# Patient Record
Sex: MALE | Race: BLACK OR AFRICAN AMERICAN | Employment: PART TIME | URBAN - METROPOLITAN AREA
[De-identification: names, ages, dates, MRNs, and addresses within clinical notes are randomized per-mention and may not be internally consistent; named-entity substitution may affect disease eponyms.]

---

## 2017-12-07 ENCOUNTER — HOSPITAL ENCOUNTER (EMERGENCY)
Facility: CLINIC | Age: 22
Discharge: HOME | End: 2017-12-07
Attending: EMERGENCY MEDICINE | Admitting: EMERGENCY MEDICINE

## 2017-12-07 VITALS
TEMPERATURE: 97.4 F | WEIGHT: 142 LBS | SYSTOLIC BLOOD PRESSURE: 126 MMHG | BODY MASS INDEX: 24.37 KG/M2 | OXYGEN SATURATION: 98 % | DIASTOLIC BLOOD PRESSURE: 67 MMHG | RESPIRATION RATE: 18 BRPM | HEART RATE: 72 BPM

## 2017-12-07 DIAGNOSIS — L73.9 FOLLICULITIS: ICD-10-CM

## 2017-12-07 PROCEDURE — 99283 EMERGENCY DEPT VISIT LOW MDM: CPT | Mod: Z6 | Performed by: EMERGENCY MEDICINE

## 2017-12-07 PROCEDURE — 99282 EMERGENCY DEPT VISIT SF MDM: CPT | Performed by: EMERGENCY MEDICINE

## 2017-12-07 RX ORDER — IBUPROFEN 600 MG/1
600 TABLET, FILM COATED ORAL ONCE
Status: DISCONTINUED | OUTPATIENT
Start: 2017-12-07 | End: 2017-12-08 | Stop reason: HOSPADM

## 2017-12-07 RX ORDER — CEPHALEXIN 500 MG/1
500 CAPSULE ORAL 4 TIMES DAILY
Qty: 28 CAPSULE | Refills: 0 | Status: SHIPPED | OUTPATIENT
Start: 2017-12-07 | End: 2017-12-14

## 2017-12-07 RX ORDER — MUPIROCIN CALCIUM 20 MG/G
CREAM TOPICAL 3 TIMES DAILY
Qty: 30 G | Refills: 0 | Status: SHIPPED | OUTPATIENT
Start: 2017-12-07 | End: 2017-12-14

## 2017-12-07 ASSESSMENT — ENCOUNTER SYMPTOMS: FEVER: 0

## 2017-12-07 NOTE — ED AVS SNAPSHOT
[facility], Emergency Department    [address]    [place] [address]    Phone:  [phone]    Fax:  [phone]                                       Denise Goodrich   MRN: [id number]    Department:  [facility], Emergency Department   Date of Visit:  12/7/2017           Patient Information     Date Of Birth          1995        Your diagnoses for this visit were:     Folliculitis        You were seen by Kelvin Hilario MD.        Discharge Instructions         Folliculitis  Folliculitis is an inflammation of a hair follicle. A hair follicle is the little pocket where a hair grows out of the skin. Bacteria normally live on the skin. But sometimes bacteria can get trapped in a follicle and cause infection. This causes a bumpy rash. The area over the follicles is red and raised. It may itch or be painful. The bumps may have fluid (pus) inside. The pus may leak and then form crusts. Sores can spread to other areas of the body. Once it goes away, folliculitis can come back at any time. Severe cases may cause permanent hair loss and scarring.  Folliculitis can happen anywhere on the body where hair grows. It can be caused by rubbing from tight clothing. Ingrown hairs can cause it. Soaking in a hot tub or swimming pool that has bacteria in the water can cause it. It may also occur if a hair follicle is blocked by a bandage.  Sores often go away in a few days with no treatment. In some cases, medicine may be given. A small piece of skin or pus may be taken to find the type of bacteria causing the infection.  Home care  The healthcare provider may prescribe an antibiotic cream or ointment.  Oral antibiotics may also be prescribed. Or you may be told to use an over-the-counter antibiotic cream. Follow all instructions when using any of these medicines.  General care:    Apply warm, moist compresses to the sores for 20 minutes up to 3 times a day. You can make a compress by soaking a cloth in warm water.  Squeeze out excess water.    Don t cut, poke, or squeeze the sores. This can be painful and spread infection.    Don t scratch the affected area. Scratching can delay healing.    Don t shave the areas affected by folliculitis.    If the sores leak fluid, cover the area with a nonstick gauze bandage. Use as little tape as possible. Carefully discard all soiled bandages.    Dress in loose cotton clothing.    Change sheets and blankets if they are soiled by pus. Wash all clothes, towels, sheets, and cloth diapers in soap and hot water. Do not share clothes, towels, or sheets with other family members.    Do not soak the sores in bath water. This can spread infection. Instead, keep the area clean by gently washing sores with soap and warm water.    Wash your hands or use antibacterial gels often to prevent spreading the bacteria.  Follow-up care  Follow up with your healthcare provider, or as advised.  When to seek medical advice  Call your healthcare provider right away if any of these occur:    Fever of 100.4 F (38 C) or higher    Spreading of the rash    Rash does not get better with treatment    Redness or swelling that gets worse    Rash becomes more painful    Foul-smelling fluid leaking from the skin    Rash improves, but then comes back   Date Last Reviewed: 11/1/2016 2000-2017 [employer]. [address] [place] [address]. All rights reserved. This information is not intended as a substitute for professional medical care. Always follow your healthcare professional's instructions.      Use mupirocin cream as directed.  Take complete course of cephalexin.  Take ibuprofen 600 mg every 6 hours with food as needed for pain.    Please make an appointment to follow up with Your Primary Care Provider or [facility] (phone: [phone]) in 1 week.     24 Hour Appointment Hotline       To make an appointment at any [facility], call [phone] ([phone]). If  you don't have a family doctor or clinic, we will help you find one. [facility] clinics are conveniently located to serve the needs of you and your family.             Review of your medicines      START taking        Dose / Directions Last dose taken    cephALEXin 500 MG capsule   Commonly known as:  KEFLEX   Dose:  500 mg   Quantity:  28 capsule        Take 1 capsule (500 mg) by mouth 4 times daily for 7 days   Refills:  0        mupirocin 2 % cream   Commonly known as:  BACTROBAN   Quantity:  30 g        Apply topically 3 times daily for 7 days Apply to folliculitis rash.   Refills:  0          Our records show that you are taking the medicines listed below. If these are incorrect, please call your family doctor or clinic.        Dose / Directions Last dose taken    IBUPROFEN PO   Dose:  400 mg        Take 400 mg by mouth every 4 hours   Refills:  0                Prescriptions were sent or printed at these locations (2 Prescriptions)                   Other Prescriptions                Printed at Department/Unit printer (2 of 2)         mupirocin (BACTROBAN) 2 % cream               cephALEXin (KEFLEX) 500 MG capsule                Orders Needing Specimen Collection     None      Pending Results     No orders found from 12/5/2017 to 12/8/2017.            Pending Culture Results     No orders found from 12/5/2017 to 12/8/2017.            Pending Results Instructions     If you had any lab results that were not finalized at the time of your Discharge, you can call the ED Lab Result RN at [phone]. You will be contacted by this team for any positive Lab results or changes in treatment. The nurses are available 7 days a week from 10A to 6:30P.  You can leave a message 24 hours per day and they will return your call.        Thank you for choosing [facility]       Thank you for choosing [facility] for your care. Our goal is always to provide you with excellent care. Hearing back from our patients is one way we can  "continue to improve our services. Please take a few minutes to complete the written survey that you may receive in the mail after you visit with us. Thank you!        [employer] Information     [employer] lets you send messages to your doctor, view your test results, renew your prescriptions, schedule appointments and more. To sign up, go to [email or web address] . Click on \"Log in\" on the left side of the screen, which will take you to the Welcome page. Then click on \"Sign up Now\" on the right side of the page.     You will be asked to enter the access code listed below, as well as some personal information. Please follow the directions to create your username and password.     Your access code is: [email or web address]  Expires: 3/7/2018  9:48 PM     Your access code will  in 90 days. If you need help or a new code, please call your [facility] clinic or [phone].        Care EveryWhere ID     This is your Care EveryWhere ID. This could be used by other organizations to access your [facility] medical records  [id number]        Equal Access to Services     [facility] CenterMCKENZIE : Hadsanto Meyer, wajosué michaels, herrera hernandez, anika valdez. So [facility] [phone].    ATENCIÓN: Si habla español, tiene a amezcua disposición servicios gratuitos de asistencia lingüística. Fredo al [phone].    We comply with applicable federal civil rights laws and [place] laws. We do not discriminate on the basis of race, color, national origin, age, disability, sex, sexual orientation, or gender identity.            After Visit Summary       This is your record. Keep this with you and show to your community pharmacist(s) and doctor(s) at your next visit.                  "

## 2017-12-07 NOTE — ED AVS SNAPSHOT
[facility], [place], Emergency Department    [address]    [place] [address]    Phone:  [phone]    Fax:  [phone]                                       Denise Goodrich   MRN: [id number]    Department:  [facility], Emergency Department   Date of Visit:  12/7/2017           After Visit Summary Signature Page     I have received my discharge instructions, and my questions have been answered. I have discussed any challenges I see with this plan with the nurse or doctor.    ..........................................................................................................................................  Patient/Patient Representative Signature      ..........................................................................................................................................  Patient Representative Print Name and Relationship to Patient    ..................................................               ................................................  Date                                            Time    ..........................................................................................................................................  Reviewed by Signature/Title    ...................................................              ..............................................  Date                                                            Time

## 2017-12-08 NOTE — DISCHARGE INSTRUCTIONS
Folliculitis  Folliculitis is an inflammation of a hair follicle. A hair follicle is the little pocket where a hair grows out of the skin. Bacteria normally live on the skin. But sometimes bacteria can get trapped in a follicle and cause infection. This causes a bumpy rash. The area over the follicles is red and raised. It may itch or be painful. The bumps may have fluid (pus) inside. The pus may leak and then form crusts. Sores can spread to other areas of the body. Once it goes away, folliculitis can come back at any time. Severe cases may cause permanent hair loss and scarring.  Folliculitis can happen anywhere on the body where hair grows. It can be caused by rubbing from tight clothing. Ingrown hairs can cause it. Soaking in a hot tub or swimming pool that has bacteria in the water can cause it. It may also occur if a hair follicle is blocked by a bandage.  Sores often go away in a few days with no treatment. In some cases, medicine may be given. A small piece of skin or pus may be taken to find the type of bacteria causing the infection.  Home care  The healthcare provider may prescribe an antibiotic cream or ointment.  Oral antibiotics may also be prescribed. Or you may be told to use an over-the-counter antibiotic cream. Follow all instructions when using any of these medicines.  General care:    Apply warm, moist compresses to the sores for 20 minutes up to 3 times a day. You can make a compress by soaking a cloth in warm water. Squeeze out excess water.    Don t cut, poke, or squeeze the sores. This can be painful and spread infection.    Don t scratch the affected area. Scratching can delay healing.    Don t shave the areas affected by folliculitis.    If the sores leak fluid, cover the area with a nonstick gauze bandage. Use as little tape as possible. Carefully discard all soiled bandages.    Dress in loose cotton clothing.    Change sheets and blankets if they are soiled by pus. Wash all clothes,  towels, sheets, and cloth diapers in soap and hot water. Do not share clothes, towels, or sheets with other family members.    Do not soak the sores in bath water. This can spread infection. Instead, keep the area clean by gently washing sores with soap and warm water.    Wash your hands or use antibacterial gels often to prevent spreading the bacteria.  Follow-up care  Follow up with your healthcare provider, or as advised.  When to seek medical advice  Call your healthcare provider right away if any of these occur:    Fever of 100.4 F (38 C) or higher    Spreading of the rash    Rash does not get better with treatment    Redness or swelling that gets worse    Rash becomes more painful    Foul-smelling fluid leaking from the skin    Rash improves, but then comes back   Date Last Reviewed: 11/1/2016 2000-2017 [employer]. [address] [place] [address]. All rights reserved. This information is not intended as a substitute for professional medical care. Always follow your healthcare professional's instructions.      Use mupirocin cream as directed.  Take complete course of cephalexin.  Take ibuprofen 600 mg every 6 hours with food as needed for pain.    Please make an appointment to follow up with Your Primary Care Provider or [facility] (phone: [phone]) in 1 week.

## 2017-12-08 NOTE — ED PROVIDER NOTES
History     Chief Complaint   Patient presents with     Rash     Facial rash began 3 days ago.  Painful and itchy.  Noted on both cheeks and below lower lip.      EMILE Goodrich is a 21 year old male who presents to the emergency department complaining of facial rash for the past 3 days.  Patient states the rash is occasionally painful and itchy.  He has noticed bumps on his bilateral cheeks and below his lower lip.  He does report that he was recently incarcerated.  He denies any fever.  He denies dental pain.  He does state that he used a different kind of soap when he was in group home.  He denies any other different or new topical exposures.    I have reviewed the Medications, Allergies, Past Medical and Surgical History, and Social History in the Epic system.    Review of Systems   Constitutional: Negative for fever.   HENT: Negative for dental problem.    Skin: Positive for rash.   All other systems reviewed and are negative.      Physical Exam   BP: 126/67  Pulse: 72  Temp: 97.4  F (36.3  C)  Resp: 18  Weight: 64.4 kg (142 lb)  SpO2: 98 %      Physical Exam   Constitutional: He is oriented to person, place, and time. He appears well-developed and well-nourished.   HENT:   Head: Normocephalic and atraumatic.       Right Ear: External ear normal.   Left Ear: External ear normal.   Mouth/Throat: Oropharynx is clear and moist.   Eyes: Pupils are equal, round, and reactive to light.   Neurological: He is alert and oriented to person, place, and time. No cranial nerve deficit.   Psychiatric: He has a normal mood and affect. His behavior is normal.   Nursing note and vitals reviewed.      ED Course     ED Course     Procedures            Critical Care time:             Labs Ordered and Resulted from Time of ED Arrival Up to the Time of Departure from the ED - No data to display         Assessments & Plan (with Medical Decision Making)   21 year old male to the emergency department with folliculitis of the face.   Patient did use a different soap when he was recently incarcerated.  The patient will be treated with both Bactroban cream as well as cephalexin.  Primary care follow-up recommended for 1 week.    I have reviewed the nursing notes.    I have reviewed the findings, diagnosis, plan and need for follow up with the patient.    New Prescriptions    CEPHALEXIN (KEFLEX) 500 MG CAPSULE    Take 1 capsule (500 mg) by mouth 4 times daily for 7 days    MUPIROCIN (BACTROBAN) 2 % CREAM    Apply topically 3 times daily for 7 days Apply to folliculitis rash.       Final diagnoses:   Folliculitis       12/7/2017   [facility], [place], EMERGENCY DEPARTMENT     Kelvin Hilario MD  12/07/17 7225

## 2018-11-28 ENCOUNTER — HOSPITAL ENCOUNTER (EMERGENCY)
Facility: CLINIC | Age: 23
Discharge: HOME | End: 2018-11-28
Attending: FAMILY MEDICINE | Admitting: FAMILY MEDICINE

## 2018-11-28 ENCOUNTER — APPOINTMENT (OUTPATIENT)
Dept: GENERAL RADIOLOGY | Facility: CLINIC | Age: 23
End: 2018-11-28
Attending: FAMILY MEDICINE

## 2018-11-28 VITALS
SYSTOLIC BLOOD PRESSURE: 149 MMHG | TEMPERATURE: 97.3 F | OXYGEN SATURATION: 98 % | DIASTOLIC BLOOD PRESSURE: 56 MMHG | HEART RATE: 78 BPM | RESPIRATION RATE: 16 BRPM

## 2018-11-28 DIAGNOSIS — S62.501A CLOSED NONDISPLACED FRACTURE OF PHALANX OF RIGHT THUMB, UNSPECIFIED PHALANX, INITIAL ENCOUNTER: ICD-10-CM

## 2018-11-28 PROCEDURE — 73140 X-RAY EXAM OF FINGER(S): CPT | Mod: RT

## 2018-11-28 PROCEDURE — 99284 EMERGENCY DEPT VISIT MOD MDM: CPT | Mod: 25 | Performed by: FAMILY MEDICINE

## 2018-11-28 PROCEDURE — 26750 TREAT FINGER FRACTURE EACH: CPT | Mod: 54 | Performed by: FAMILY MEDICINE

## 2018-11-28 PROCEDURE — 26750 TREAT FINGER FRACTURE EACH: CPT | Mod: RT | Performed by: FAMILY MEDICINE

## 2018-11-28 PROCEDURE — 25000132 ZZH RX MED GY IP 250 OP 250 PS 637: Performed by: FAMILY MEDICINE

## 2018-11-28 RX ORDER — OXYCODONE AND ACETAMINOPHEN 5; 325 MG/1; MG/1
1 TABLET ORAL ONCE
Status: DISCONTINUED | OUTPATIENT
Start: 2018-11-28 | End: 2018-11-28 | Stop reason: HOSPADM

## 2018-11-28 RX ORDER — OXYCODONE HYDROCHLORIDE 5 MG/1
5 TABLET ORAL EVERY 6 HOURS PRN
Qty: 10 TABLET | Refills: 0 | Status: SHIPPED | OUTPATIENT
Start: 2018-11-28 | End: 2019-01-31

## 2018-11-28 RX ORDER — IBUPROFEN 800 MG/1
800 TABLET, FILM COATED ORAL ONCE
Status: COMPLETED | OUTPATIENT
Start: 2018-11-28 | End: 2018-11-28

## 2018-11-28 RX ADMIN — IBUPROFEN 800 MG: 800 TABLET ORAL at 16:23

## 2018-11-28 NOTE — ED NOTES
"Pt reports \"jamming' right thumb 2 days ago. Now unable to move thumb distal joint. Edema around joint. Pt is a .   "

## 2018-11-28 NOTE — ED AVS SNAPSHOT
[facility], Emergency Department    [address]    [place] [address]    Phone:  [phone]    Fax:  [phone]                                       Denise Goodrich   MRN: [id number]    Department:  [facility], Emergency Department   Date of Visit:  11/28/2018           Patient Information     Date Of Birth          1995        Your diagnoses for this visit were:     Closed nondisplaced fracture of phalanx of right thumb, unspecified phalanx, initial encounter        You were seen by Chance Ponce MD.        Discharge Instructions       Thank you for choosing [facility].     Please closely monitor for further symptoms. Return to the Emergency Department if you develop any new or worsening signs or symptoms.    If you received any opiate pain medications or sedatives during your visit, please do not drive for at least 8 hours.     Labs, cultures or final xray interpretations may still need to be reviewed.  We will call you if your plan of care needs to be changed.    Please make an appointment to follow up with Orthopedics (phone: [phone]) in next 5-7 days.        24 Hour Appointment Hotline       To make an appointment at any [facility], call [phone] ([phone]). If you don't have a family doctor or clinic, we will help you find one. [facility] clinics are conveniently located to serve the needs of you and your family.             Review of your medicines      START taking        Dose / Directions Last dose taken    oxyCODONE 5 MG tablet   Commonly known as:  ROXICODONE   Dose:  5 mg   Quantity:  10 tablet        Take 1 tablet (5 mg) by mouth every 6 hours as needed for pain   Refills:  0          Our records show that you are taking the medicines listed below. If these are incorrect, please call your family doctor or clinic.        Dose / Directions Last dose taken    IBUPROFEN PO   Dose:  400 mg        Take 400 mg by mouth every 4 hours    Refills:  0                Information about OPIOIDS     PRESCRIPTION OPIOIDS: WHAT YOU NEED TO KNOW   We gave you an opioid (narcotic) pain medicine. It is important to manage your pain, but opioids are not always the best choice. You should first try all the other options your care team gave you. Take this medicine for as short a time (and as few doses) as possible.    Some activities can increase your pain, such as bandage changes or therapy sessions. It may help to take your pain medicine 30 to 60 minutes before these activities. Reduce your stress by getting enough sleep, working on hobbies you enjoy and practicing relaxation or meditation. Talk to your care team about ways to manage your pain beyond prescription opioids.    These medicines have risks:    DO NOT drive when on new or higher doses of pain medicine. These medicines can affect your alertness and reaction times, and you could be arrested for driving under the influence (DUI). If you need to use opioids long-term, talk to your care team about driving.    DO NOT operate heavy machinery    DO NOT do any other dangerous activities while taking these medicines.    DO NOT drink any alcohol while taking these medicines.     If the opioid prescribed includes acetaminophen, DO NOT take with any other medicines that contain acetaminophen. Read all labels carefully. Look for the word  acetaminophen  or  Tylenol.  Ask your pharmacist if you have questions or are unsure.    You can get addicted to pain medicines, especially if you have a history of addiction (chemical, alcohol or substance dependence). Talk to your care team about ways to reduce this risk.    All opioids tend to cause constipation. Drink plenty of water and eat foods that have a lot of fiber, such as fruits, vegetables, prune juice, apple juice and high-fiber cereal. Take a laxative (Miralax, milk of magnesia, Colace, Senna) if you don t move your bowels at least every other day. Other side  effects include upset stomach, sleepiness, dizziness, throwing up, tolerance (needing more of the medicine to have the same effect), physical dependence and slowed breathing.    Store your pills in a secure place, locked if possible. We will not replace any lost or stolen medicine. If you don t finish your medicine, please throw away (dispose) as directed by your pharmacist. The [place] [employer] has more information about safe disposal: https://[email or web address].[facility].[place]/living-green/managing-unwanted-medications        Prescriptions were sent or printed at these locations (1 Prescription)                   Other Prescriptions                Printed at Department/Unit printer (1 of 1)         oxyCODONE (ROXICODONE) 5 MG tablet                Procedures and tests performed during your visit     XR Finger Right G/E 2 Views      Orders Needing Specimen Collection     None      Pending Results     Date and Time Order Name Status Description    11/28/2018 1604 XR Finger Right G/E 2 Views Preliminary             Pending Culture Results     No orders found from 11/26/2018 to 11/29/2018.            Pending Results Instructions     If you had any lab results that were not finalized at the time of your Discharge, you can call the ED Lab Result RN at [phone]. You will be contacted by this team for any positive Lab results or changes in treatment. The nurses are available 7 days a week from 10A to 6:30P.  You can leave a message 24 hours per day and they will return your call.        Thank you for choosing [facility]       Thank you for choosing [facility] for your care. Our goal is always to provide you with excellent care. Hearing back from our patients is one way we can continue to improve our services. Please take a few minutes to complete the written survey that you may receive in the mail after you visit with us. Thank you!        [employer] Information     [employer] lets you send messages to your doctor, view your  "test results, renew your prescriptions, schedule appointments and more. To sign up, go to [email or web address] . Click on \"Log in\" on the left side of the screen, which will take you to the Welcome page. Then click on \"Sign up Now\" on the right side of the page.     You will be asked to enter the access code listed below, as well as some personal information. Please follow the directions to create your username and password.     Your access code is: [email or web address]  Expires: 2019  5:01 PM     Your access code will  in 90 days. If you need help or a new code, please call your [facility] [place] [facility] clinic or [phone].        Care EveryWhere ID     This is your Care EveryWhere ID. This could be used by other organizations to access your [place] medical records  [id number]        Equal Access to Services     TIGRE JAMISON : Hadsanto Meyer, betsy michaels, herrera escobaralclyde hernandez, anika zhao . So [facility] [phone].    ATENCIÓN: Si habla español, tiene a amezcua disposición servicios gratuitos de asistencia lingüística. Llame al [phone].    We comply with applicable federal civil rights laws and [place] laws. We do not discriminate on the basis of race, color, national origin, age, disability, sex, sexual orientation, or gender identity.            After Visit Summary       This is your record. Keep this with you and show to your community pharmacist(s) and doctor(s) at your next visit.                  "

## 2018-11-28 NOTE — DISCHARGE INSTRUCTIONS
Thank you for choosing [facility].     Please closely monitor for further symptoms. Return to the Emergency Department if you develop any new or worsening signs or symptoms.    If you received any opiate pain medications or sedatives during your visit, please do not drive for at least 8 hours.     Labs, cultures or final xray interpretations may still need to be reviewed.  We will call you if your plan of care needs to be changed.    Please make an appointment to follow up with Orthopedics (phone: [phone]) in next 5-7 days.

## 2018-11-28 NOTE — ED AVS SNAPSHOT
[facility], [place], Emergency Department    [address]    [place] [address]    Phone:  [phone]    Fax:  [phone]                                       Denise Goodrich   MRN: [id number]    Department:  [facility], Emergency Department   Date of Visit:  11/28/2018           After Visit Summary Signature Page     I have received my discharge instructions, and my questions have been answered. I have discussed any challenges I see with this plan with the nurse or doctor.    ..........................................................................................................................................  Patient/Patient Representative Signature      ..........................................................................................................................................  Patient Representative Print Name and Relationship to Patient    ..................................................               ................................................  Date                                   Time    ..........................................................................................................................................  Reviewed by Signature/Title    ...................................................              ..............................................  Date                                               Time          22EPIC Rev 08/18

## 2018-11-28 NOTE — ED PROVIDER NOTES
History     Chief Complaint   Patient presents with     Thumb Discomfort     R thumb pain and swelling started 2 days ago; may have hurt it working as  but not sure     HPI  Denise Goodrich is a 22 year old male who presents to the Emergency Department today for evaluation of right thumb pain. The patient is a  and states that he was working 2 days ago when he jammed his thumb. He states he has been having pain in his right thumb and swelling at his distal right thumb joint.     I have reviewed the Medications, Allergies, Past Medical and Surgical History, and Social History in the [facility] system.    Past Medical History:   Diagnosis Date     Acute low back pain due to trauma      History reviewed. No pertinent surgical history.    No family history on file.    Social History   Substance Use Topics     Smoking status: Current Every Day Smoker     Packs/day: 1.00     Smokeless tobacco: Never Used     Alcohol use No     Current Facility-Administered Medications   Medication     oxyCODONE-acetaminophen (PERCOCET) 5-325 MG per tablet 1 tablet     Current Outpatient Prescriptions   Medication     oxyCODONE (ROXICODONE) 5 MG tablet     IBUPROFEN PO     Allergies   Allergen Reactions     Vicodin [Hydrocodone-Acetaminophen]      Causes headaches      Review of Systems  ROS: 14 point ROS neg other than the symptoms noted above in the HPI.   Physical Exam   BP: 144/78  Pulse: 89  Temp: 97.3  F (36.3  C)  Resp: 16  SpO2: 97 %    Physical Exam   Constitutional: He is oriented to person, place, and time. He appears well-developed and well-nourished. No distress.   HENT:   Head: Normocephalic.   Eyes: Pupils are equal, round, and reactive to light.   Neck: Neck supple.   Cardiovascular: Normal rate and intact distal pulses.    Pulmonary/Chest: Effort normal.   Musculoskeletal: He exhibits tenderness.        Hands:  Neurological: He is alert and oriented to person, place, and time.   Skin: Skin is warm and dry. No  rash noted. No erythema.   Psychiatric: He has a normal mood and affect.       ED Course   4:00 PM  The patient was seen and examined by Dr. Ponce in Room 20.    ED Course     Procedures             Critical Care time:  none          FINGER(S) TWO-THREE VIEWS RIGHT 11/28/2018 4:35 PM     HISTORY: Trauma;     COMPARISON: None.    IMPRESSION: There is a chip fracture off the base of the distal  phalanx.. Fragment measures about 4 mm in diameter. Fracture line  extends into the articular surface.        Labs Ordered and Resulted from Time of ED Arrival Up to the Time of Departure from the ED - No data to display         Assessments & Plan (with Medical Decision Making)   Patient injured his right thumb while working on a car.  He has pain and swelling at the IP joint of the right thumb without obvious deformity or ligamentous injury.  X-ray reveals a fracture at the base of the proximal phalanx which is intra-articular.  He was placed in a Velcro splint and treated for pain.  He has a history of opiate dependence but states he is sober now.  He has no concerns about taking short-term prescription for pain.    I have reviewed the nursing notes.    I have reviewed the findings, diagnosis, plan and need for follow up with the patient.    New Prescriptions    OXYCODONE (ROXICODONE) 5 MG TABLET    Take 1 tablet (5 mg) by mouth every 6 hours as needed for pain       Final diagnoses:   Closed nondisplaced fracture of phalanx of right thumb, unspecified phalanx, initial encounter   I, Yosvany Fajardo, am serving as a trained medical scribe to document services personally performed by Pablo Whitney MD, based on the provider's statements to me.   IPablo MD, was physically present and have reviewed and verified the accuracy of this note documented by Yosvany Fajardo.     11/28/2018   [facility], EMERGENCY DEPARTMENT     Chance Ponce MD  11/28/18 1700

## 2019-01-31 ENCOUNTER — HOSPITAL ENCOUNTER (EMERGENCY)
Facility: CLINIC | Age: 24
Discharge: HOME | End: 2019-01-31
Attending: EMERGENCY MEDICINE | Admitting: EMERGENCY MEDICINE
Payer: MEDICAID

## 2019-01-31 ENCOUNTER — APPOINTMENT (OUTPATIENT)
Dept: GENERAL RADIOLOGY | Facility: CLINIC | Age: 24
End: 2019-01-31
Payer: MEDICAID

## 2019-01-31 VITALS
WEIGHT: 155.38 LBS | DIASTOLIC BLOOD PRESSURE: 65 MMHG | TEMPERATURE: 98.2 F | OXYGEN SATURATION: 99 % | BODY MASS INDEX: 26.67 KG/M2 | HEART RATE: 80 BPM | SYSTOLIC BLOOD PRESSURE: 120 MMHG | RESPIRATION RATE: 16 BRPM

## 2019-01-31 DIAGNOSIS — S62.524A: ICD-10-CM

## 2019-01-31 DIAGNOSIS — S62.524D CLOSED NONDISPLACED FRACTURE OF DISTAL PHALANX OF RIGHT THUMB WITH ROUTINE HEALING, SUBSEQUENT ENCOUNTER: ICD-10-CM

## 2019-01-31 DIAGNOSIS — M79.644 THUMB PAIN, RIGHT: ICD-10-CM

## 2019-01-31 PROCEDURE — 25000132 ZZH RX MED GY IP 250 OP 250 PS 637: Performed by: EMERGENCY MEDICINE

## 2019-01-31 PROCEDURE — 73140 X-RAY EXAM OF FINGER(S): CPT | Mod: RT

## 2019-01-31 PROCEDURE — 29125 APPL SHORT ARM SPLINT STATIC: CPT | Mod: RT | Performed by: EMERGENCY MEDICINE

## 2019-01-31 PROCEDURE — 99284 EMERGENCY DEPT VISIT MOD MDM: CPT | Mod: 25 | Performed by: EMERGENCY MEDICINE

## 2019-01-31 PROCEDURE — 99284 EMERGENCY DEPT VISIT MOD MDM: CPT | Mod: 25

## 2019-01-31 PROCEDURE — 29125 APPL SHORT ARM SPLINT STATIC: CPT | Mod: RT

## 2019-01-31 RX ORDER — TRAMADOL HYDROCHLORIDE 50 MG/1
50 TABLET ORAL EVERY 4 HOURS PRN
Qty: 10 TABLET | Refills: 0 | Status: SHIPPED | OUTPATIENT
Start: 2019-01-31 | End: 2019-03-02

## 2019-01-31 RX ORDER — IBUPROFEN 600 MG/1
600 TABLET, FILM COATED ORAL ONCE
Status: COMPLETED | OUTPATIENT
Start: 2019-01-31 | End: 2019-01-31

## 2019-01-31 RX ADMIN — IBUPROFEN 600 MG: 600 TABLET ORAL at 15:41

## 2019-01-31 NOTE — ED PROVIDER NOTES
History     Chief Complaint   Patient presents with     Thumb Discomfort     Was seen here one month ago and told he had a fracture in the right thumb. Fell yesterday on the ice and hit the same spot, bending back the thumb again. Proximal thumb swollen. Numbness in tip and is very painful.      The history is provided by the patient and medical records.     Denise Goodrich is a 23 year old male who presents to the Emergency Department for evaluation after a fall, which occurred yesterday.  The patient reports he slipped and fell reinjuring his right thumb.  He was seen here approximately 1 month ago was told he had a fracture in that right thumb.  Patient denies any pain or trauma from the fall.  He denies other chronic medical issues.    I have reviewed the Medications, Allergies, Past Medical and Surgical History, and Social History in the [facility] system.    Past Medical History:   Diagnosis Date     Acute low back pain due to trauma        History reviewed. No pertinent surgical history.    No family history on file.    Social History     Tobacco Use     Smoking status: Current Every Day Smoker     Packs/day: 1.00     Smokeless tobacco: Never Used   Substance Use Topics     Alcohol use: No       No current facility-administered medications for this encounter.      Current Outpatient Medications   Medication     traMADol (ULTRAM) 50 MG tablet     IBUPROFEN PO        Allergies   Allergen Reactions     Vicodin [Hydrocodone-Acetaminophen]      Causes headaches       Review of Systems   Musculoskeletal:        Positive for right thumb pain   All other systems reviewed and are negative.      Physical Exam   BP: 131/61  Pulse: 80  Heart Rate: 83  Temp: 96.7  F (35.9  C)  Resp: 16  Weight: 70.5 kg (155 lb 6 oz)  SpO2: 99 %      Physical Exam   Constitutional: He appears well-developed. No distress.   HENT:   Head: Normocephalic.   Eyes: EOM are normal. No scleral icterus.   Neck: Neck supple.   Pulmonary/Chest: No  respiratory distress.   Musculoskeletal: He exhibits no deformity.   Right thumb diffuse tenderness with no apparent deformity.  Sensation intact.  Range of motion limited by pain   Neurological: He is alert.   Skin: Skin is dry.   Nursing note and vitals reviewed.      ED Course   3:10 PM  The patient was seen and examined by Dr. Sorto in Room ED09.        Procedures           Labs Ordered and Resulted from Time of ED Arrival Up to the Time of Departure from the ED - No data to display  Results for orders placed or performed during the hospital encounter of 01/31/19   XR Finger Right G/E 2 Views    Narrative    RIGHT FINGER TWO OR MORE VIEWS 1/31/2019 3:09 PM     HISTORY: Pain, fall.    COMPARISON: 11/28/2018.      Impression    IMPRESSION: Previous chip fracture through the base of the distal  first phalanx demonstrates interval sclerosis and healing. The  fracture line is still visible. No new acute lucent fracture line is  visualized.    JANE PENNINGTON MD            Assessments & Plan (with Medical Decision Making)   23-year-old presents with a chief complaint of thumb pain.  Differential includes fracture, tendon disruption, contusion.  There is no evidence of infection on exam.  Healing fracture present on x-ray.  No new displacement of fracture sites.  We will place the patient in a thumb spica splint and have him follow-up with Orth O for continued pain.  It is noted that patient was seen at [facility] 2 days ago with a similar complaint.  Strongly encouraged him to follow-up with orthopedics.    I have reviewed the nursing notes.    I have reviewed the findings, diagnosis, plan and need for follow up with the patient.       Medication List      Started    traMADol 50 MG tablet  Commonly known as:  ULTRAM  50 mg, Oral, EVERY 4 HOURS PRN            Final diagnoses:   Thumb pain, right   Closed nondisplaced fracture of distal phalanx of right thumb with routine healing, subsequent encounter   I, Amaury Hines,  am serving as a trained medical scribe to document services personally performed by Donald Sorto DO, based on the provider's statements to me.      I, Donald Sorto DO, was physically present and have reviewed and verified the accuracy of this note documented by Amaury Hines.    1/31/2019   [facility], [place], EMERGENCY DEPARTMENT     Donald Sorto DO  02/01/19 0737

## 2019-01-31 NOTE — DISCHARGE INSTRUCTIONS
Please make an appointment to follow up with Orthopedics (phone: [phone]) as soon as possible if not improving.

## 2019-01-31 NOTE — ED AVS SNAPSHOT
[facility], [place], Emergency Department  [address]  [place] [address]  Phone:  [phone]  Fax:  [phone]                                    Denise Goodrich   MRN: [id number]    Department:  [facility], Emergency Department   Date of Visit:  1/31/2019           After Visit Summary Signature Page    I have received my discharge instructions, and my questions have been answered. I have discussed any challenges I see with this plan with the nurse or doctor.    ..........................................................................................................................................  Patient/Patient Representative Signature      ..........................................................................................................................................  Patient Representative Print Name and Relationship to Patient    ..................................................               ................................................  Date                                   Time    ..........................................................................................................................................  Reviewed by Signature/Title    ...................................................              ..............................................  Date                                               Time          22EPIC Rev 08/18

## 2020-09-21 ENCOUNTER — HOSPITAL ENCOUNTER (EMERGENCY)
Facility: CLINIC | Age: 25
End: 2020-09-21

## 2020-09-21 ENCOUNTER — HOSPITAL ENCOUNTER (EMERGENCY)
Facility: CLINIC | Age: 25
Discharge: HOME | End: 2020-09-21
Attending: EMERGENCY MEDICINE | Admitting: EMERGENCY MEDICINE

## 2020-09-21 VITALS
OXYGEN SATURATION: 100 % | HEART RATE: 98 BPM | RESPIRATION RATE: 18 BRPM | SYSTOLIC BLOOD PRESSURE: 122 MMHG | DIASTOLIC BLOOD PRESSURE: 91 MMHG | TEMPERATURE: 96.7 F

## 2020-09-21 DIAGNOSIS — M79.632 PAIN OF LEFT FOREARM: ICD-10-CM

## 2020-09-21 PROCEDURE — 99283 EMERGENCY DEPT VISIT LOW MDM: CPT

## 2020-09-21 PROCEDURE — 99283 EMERGENCY DEPT VISIT LOW MDM: CPT | Mod: Z6 | Performed by: EMERGENCY MEDICINE

## 2020-09-21 PROCEDURE — 25000132 ZZH RX MED GY IP 250 OP 250 PS 637: Performed by: EMERGENCY MEDICINE

## 2020-09-21 RX ORDER — IBUPROFEN 600 MG/1
600 TABLET, FILM COATED ORAL ONCE
Status: COMPLETED | OUTPATIENT
Start: 2020-09-21 | End: 2020-09-21

## 2020-09-21 RX ADMIN — IBUPROFEN 600 MG: 600 TABLET ORAL at 01:49

## 2020-09-21 ASSESSMENT — ENCOUNTER SYMPTOMS
NECK PAIN: 0
SHORTNESS OF BREATH: 0
CONFUSION: 0
FEVER: 0
BRUISES/BLEEDS EASILY: 0
NUMBNESS: 1
WEAKNESS: 1

## 2020-09-21 NOTE — ED PROVIDER NOTES
"ED Provider Note  [facility]      History     Chief Complaint   Patient presents with     Arm Pain     left arm pain for 1 week     The history is provided by the patient and medical records.     Denise Goodrich is a 24 year old male with a PMH for heroin use, nicotine abuse, and marijuana abuse who presents to the ED with complaint of left arm pain.    Patient reports having left arm pain for the past week. He states the pain is throbbing and constant. He states the pain is worse when he sleeps because \"he be sleeping wild\". He notes numbness, tingling and weakness in his left hand up to his forearm. Patient denies recent trauma to the left arm. Patient denies neck pain, alcohol use or other medical problems. He notes being sober for 2 years from heroin. He states using a percocet that someone offered him for the pain that alleviated his symptoms.     Past Medical History  Past Medical History:   Diagnosis Date     Acute low back pain due to trauma      History reviewed. No pertinent surgical history.  IBUPROFEN PO      Allergies   Allergen Reactions     Vicodin [Hydrocodone-Acetaminophen]      Causes headaches     Family History  History reviewed. No pertinent family history.  Social History   Social History     Tobacco Use     Smoking status: Current Every Day Smoker     Packs/day: 1.00     Smokeless tobacco: Never Used   Substance Use Topics     Alcohol use: No     Drug use: Yes     Types: Marijuana     Comment: 9/20      Past medical history, past surgical history, medications, allergies, family history, and social history were reviewed with the patient. No additional pertinent items.       Review of Systems   Constitutional: Negative for fever.   Respiratory: Negative for shortness of breath.    Cardiovascular: Negative for chest pain.   Musculoskeletal: Negative for neck pain.        Left arm pain   Skin:        IV track scars   Allergic/Immunologic: Negative for immunocompromised " state.   Neurological: Positive for weakness and numbness.        Tingling in left arm   Hematological: Does not bruise/bleed easily.   Psychiatric/Behavioral: Negative for confusion.   All other systems reviewed and are negative.    A complete review of systems was performed with pertinent positives and negatives noted in the HPI, and all other systems negative.    Physical Exam   BP: (!) 122/91  Pulse: 98  Temp: 96.7  F (35.9  C)  Resp: 18  SpO2: 100 %  Physical Exam  General: Afebrile, no acute distress   HEENT: Normocephalic, atraumatic, conjunctivae normal. MMM  Neck: non-tender, supple  Cardio: regular rate.   Resp: Normal work of breathing, no respiratory distress  Chest/Back: no visual signs of trauma  Abdomen: soft, no tenderness  Neuro: alert and fully oriented. CN II-XII grossly intact. Grossly normal strength and sensation in all extremities.   MSK: no deformities. Normal range of motion, no TTP  Integumentary/Skin: no rash visualized, normal color, IV track scars  Psych: normal affect, normal behavior    ED Course     1:41 AM  The patient was seen and examined by Dr. Tania Amin in Room ED08.    Procedures     No results found for any visits on 09/21/20.  Medications   ibuprofen (ADVIL/MOTRIN) tablet 600 mg (600 mg Oral Given 9/21/20 0149)        Assessments & Plan (with Medical Decision Making)   Denise Goodrich is a 24 year old male with a PMH for heroin use, nicotine abuse, and marijuana abuse who presents to the ED with complaint of left arm pain x 1 week.  Patient denies any trauma or injury.  Reports pain to his left forearm, on examination patient has some old IV track marks scars, no signs of acute infection, swelling, erythema, contusions, ecchymosis.  Patient with full range of motion, flailing his arm all around during the history in no distress.  No focal motor or sensory deficit.  At this time no clear etiology of patient's symptoms.  Patient given ibuprofen for the discomfort,  recommend supportive care with rest, Tylenol, ibuprofen, and close follow-up with his primary care provider if no improvement in symptoms.  Patient understands and agrees with the plan.  Return precautions discussed.    I have reviewed the nursing notes. I have reviewed the findings, diagnosis, plan and need for follow up with the patient.    Discharge Medication List as of 9/21/2020  1:59 AM          Final diagnoses:   Pain of left forearm     I, Tulio Rico, am serving as a trained medical scribe to document services personally performed by Tania Amin MD, based on the provider's statements to me.     I, Tania Amin MD, was physically present and have reviewed and verified the accuracy of this note documented by Tulio Rico.    --  Tania Amin  [facility], [place], EMERGENCY DEPARTMENT  9/21/2020     Tania Aimn MD  09/21/20 0231

## 2020-09-21 NOTE — ED AVS SNAPSHOT
[facility], [place], Emergency Department  [address]  [place] [address]  Phone:  [phone]  Fax:  [phone]                                    Denise Goodrich   MRN: [id number]    Department:  [facility], Emergency Department   Date of Visit:  9/21/2020           After Visit Summary Signature Page    I have received my discharge instructions, and my questions have been answered. I have discussed any challenges I see with this plan with the nurse or doctor.    ..........................................................................................................................................  Patient/Patient Representative Signature      ..........................................................................................................................................  Patient Representative Print Name and Relationship to Patient    ..................................................               ................................................  Date                                   Time    ..........................................................................................................................................  Reviewed by Signature/Title    ...................................................              ..............................................  Date                                               Time          22EPIC Rev 08/18

## 2020-09-21 NOTE — DISCHARGE INSTRUCTIONS
Thank you for your patience today.  Please follow-up with your regular doctor in the next 2-3 days for further evaluation and follow-up care.  Please call to schedule an appointment.  Please continue your own medications.  You may take tylenol or ibuprofen as needed for pain. Please return to the ER if you develop any worsening of your current symptoms.  It was a pleasure taking care of you today.  We hope you feel better soon.